# Patient Record
Sex: FEMALE | Race: BLACK OR AFRICAN AMERICAN | Employment: UNEMPLOYED | URBAN - METROPOLITAN AREA
[De-identification: names, ages, dates, MRNs, and addresses within clinical notes are randomized per-mention and may not be internally consistent; named-entity substitution may affect disease eponyms.]

---
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
## 2025-02-18 ENCOUNTER — TELEPHONE (OUTPATIENT)
Age: 20
End: 2025-02-18

## 2025-02-18 DIAGNOSIS — Z87.898 HISTORY OF PREDIABETES: Primary | ICD-10-CM

## 2025-02-18 DIAGNOSIS — E06.3 AUTOIMMUNE HYPOTHYROIDISM: ICD-10-CM

## 2025-02-18 DIAGNOSIS — E06.3 HASHIMOTO'S DISEASE: ICD-10-CM

## 2025-02-18 NOTE — TELEPHONE ENCOUNTER
Patient was referred to [facility] and that office is requesting office notes, labs, demographics and a referral.    Please fax to [phone]  # [phone]

## 2025-02-18 NOTE — TELEPHONE ENCOUNTER
Called anusha to let her know that the referral was obtained and is being faxed over to [facility] with her notes and labs as requested

## 2025-02-18 NOTE — TELEPHONE ENCOUNTER
Patient Re says that she is transitioning to adult care.  The new provider needs a referral, office notes, demographics, and recent labs.  Patient would like a return call.    Please advise.    Patient [phone]  [facility] Fax [phone]

## 2025-02-25 LAB — HBA1C MFR BLD HPLC: 10.7 %

## 2025-02-27 ENCOUNTER — TELEPHONE (OUTPATIENT)
Age: 20
End: 2025-02-27

## 2025-02-27 NOTE — TELEPHONE ENCOUNTER
Called. No ans. LM on VM with the names/numbers of a few providers in the [place] area.  Dr Downey was one and so was ANGIE Hubbard.  Asked for a call back with questions and to discuss further

## 2025-04-02 ENCOUNTER — OFFICE VISIT (OUTPATIENT)
Age: 20
End: 2025-04-02
Payer: MEDICAID

## 2025-04-02 VITALS
OXYGEN SATURATION: 99 % | BODY MASS INDEX: 37.1 KG/M2 | DIASTOLIC BLOOD PRESSURE: 79 MMHG | TEMPERATURE: 97.7 F | HEIGHT: 67 IN | SYSTOLIC BLOOD PRESSURE: 138 MMHG | HEART RATE: 78 BPM | WEIGHT: 236.4 LBS

## 2025-04-02 DIAGNOSIS — Z86.39 HISTORY OF HYPOTHYROIDISM: ICD-10-CM

## 2025-04-02 DIAGNOSIS — E10.65 TYPE 1 DIABETES MELLITUS WITH HYPERGLYCEMIA (HCC): Primary | ICD-10-CM

## 2025-04-02 DIAGNOSIS — E10.65 TYPE 1 DIABETES MELLITUS WITH HYPERGLYCEMIA (HCC): ICD-10-CM

## 2025-04-02 PROCEDURE — 99204 OFFICE O/P NEW MOD 45 MIN: CPT | Performed by: INTERNAL MEDICINE

## 2025-04-02 PROCEDURE — 99214 OFFICE O/P EST MOD 30 MIN: CPT | Performed by: INTERNAL MEDICINE

## 2025-04-02 RX ORDER — INSULIN GLARGINE 100 [IU]/ML
22 INJECTION, SOLUTION SUBCUTANEOUS NIGHTLY
Qty: 35 ML | Refills: 3 | Status: SHIPPED | OUTPATIENT
Start: 2025-04-02

## 2025-04-02 RX ORDER — BLOOD SUGAR DIAGNOSTIC
STRIP MISCELLANEOUS
Qty: 400 EACH | Refills: 3 | Status: SHIPPED | OUTPATIENT
Start: 2025-04-02 | End: 2025-04-03 | Stop reason: SDUPTHER

## 2025-04-02 RX ORDER — INSULIN LISPRO 100 [IU]/ML
INJECTION, SOLUTION INTRAVENOUS; SUBCUTANEOUS
Qty: 25 ML | Refills: 3 | Status: SHIPPED | OUTPATIENT
Start: 2025-04-02

## 2025-04-02 RX ORDER — INSULIN LISPRO 100 [IU]/ML
INJECTION, SOLUTION INTRAVENOUS; SUBCUTANEOUS
COMMUNITY
Start: 2025-02-28 | End: 2025-04-02 | Stop reason: SDUPTHER

## 2025-04-02 RX ORDER — ACYCLOVIR 400 MG/1
TABLET ORAL
Qty: 3 EACH | Refills: 3 | Status: SHIPPED | OUTPATIENT
Start: 2025-04-02

## 2025-04-02 NOTE — PROGRESS NOTES
Consultation - Diabetes     PCP: Henna Panchal MD    Chief Complaint   Patient presents with    Diabetes     HPI:  Re Bartlett is a 20 y.o. female with  has a past medical history of Diabetes mellitus (HCC), Hypertension, POTS (postural orthostatic tachycardia syndrome), and Thyroid activity decreased. who is seen in consultation at the request of Henna Panchal MD for evaluation of diabetes.     Diagnosed with Type 1 diabetes: 2/2025 after presenting with DKA   Went to ER for fatigue, polyuria/urinary frequency     Hypothyroidism dx at age 8   Patient has no known history of thyroid surgery, radioactive iodine or ionizing radiation to the head or the neck.  No family history of thyroid cancer. No known history of thyroid nodule or prior FNA of thyroid.   Levothyroxine stopped at age, then euthyroid per pt report.     Current DM medications:  Lantus 30 units qhs   Humalog SS TID w/meals, approx 4 units w/meals     BG trends:  Checking 8-10x/day   Range: 50-300s     No high sugar beverages   Activity - limited to ADL's     Full ROS completed this visit:  +weight gain after restarting insulin   Negative for fevers, chills, blurry vision, changes in vision, chest pain, palpitations, leg swelling, shortness of breath, wheezing, snoring, abdominal pain, nausea, vomiting, diarrhea, constipation, frequent urination, dysuria, tremors, fainting, shakes, cold intolerance, hot intolerance, depression, anxiety, foot sores, rash.    LABS/STUDIES:     No results found for: \"[id number]\"    Lab Results   Component Value Date/Time    LABA1C 5.5 05/31/2023 12:00 AM    LABA1C 5.5 03/31/2022 12:00 AM    LABA1C 5.3 06/13/2019 09:18 AM    CREATININE 0.87 03/31/2022 12:00 AM    LABGLOM CANCELED 03/31/2022 12:00 AM       Lab Results   Component Value Date/Time    CHOL 155 03/31/2022 12:00 AM    TRIG 57 03/31/2022 12:00 AM    HDL 50 03/31/2022 12:00 AM       Lab Results   Component Value Date/Time    TSH 3.890 05/31/2023 12:00 AM       Lab

## 2025-04-02 NOTE — PROGRESS NOTES
Re Bartlett is a 20 y.o. female here for   Chief Complaint   Patient presents with    Diabetes       1. Have you been to the ER, urgent care clinic since your last visit?  Hospitalized since your last visit? -Fiorella liu Feb 2025 DKA for multiple nights    2. Have you seen or consulted any other health care providers outside of the [facility] since your last visit?  Include any pap smears or colon screening.-No

## 2025-04-02 NOTE — PATIENT INSTRUCTIONS
Decrease Lantus 22 units nightly   Increase Humalog 5 units with meals plus scale  Glucose: Dose:               No Insulin  140-199 1 Unit  200-249 2 Units  250-299 3 Units  300-349 4 Units  350-399 5 Units  Over 399 6 Units    Diabetes general guidelines:   Target glucose  Fastin-130  2 hours after meals: less than 180    Check glucose levels as directed   Have a small snack if glucose is less than 80 at bedtime    Seek urgent care if glucose levels above 300s and not coming down, especially if associated with excessive thirst, urination, confusion or generally feeling unwell.     For Blood Glucose < 70 mg/dl treat with 4 ounces of juice or 4 glucose tablets (15 g). Recheck Blood Glucose in 15 minutes. Repeat until blood glucose is > 70.

## 2025-04-03 ENCOUNTER — PATIENT MESSAGE (OUTPATIENT)
Age: 20
End: 2025-04-03

## 2025-04-03 DIAGNOSIS — E10.65 TYPE 1 DIABETES MELLITUS WITH HYPERGLYCEMIA (HCC): ICD-10-CM

## 2025-04-03 LAB
25(OH)D3+25(OH)D2 SERPL-MCNC: 26.1 NG/ML (ref 30–100)
ALBUMIN SERPL-MCNC: 4.2 G/DL (ref 4–5)
ALP SERPL-CCNC: 71 IU/L (ref 42–106)
ALT SERPL-CCNC: 17 IU/L (ref 0–32)
AST SERPL-CCNC: 18 IU/L (ref 0–40)
BILIRUB SERPL-MCNC: 0.6 MG/DL (ref 0–1.2)
BUN SERPL-MCNC: 13 MG/DL (ref 6–20)
BUN/CREAT SERPL: 15 (ref 9–23)
CALCIUM SERPL-MCNC: 9.8 MG/DL (ref 8.7–10.2)
CHLORIDE SERPL-SCNC: 103 MMOL/L (ref 96–106)
CO2 SERPL-SCNC: 24 MMOL/L (ref 20–29)
CREAT SERPL-MCNC: 0.84 MG/DL (ref 0.57–1)
EGFRCR SERPLBLD CKD-EPI 2021: 102 ML/MIN/1.73
GLOBULIN SER CALC-MCNC: 2.6 G/DL (ref 1.5–4.5)
GLUCOSE SERPL-MCNC: 121 MG/DL (ref 70–99)
POTASSIUM SERPL-SCNC: 4.4 MMOL/L (ref 3.5–5.2)
PROT SERPL-MCNC: 6.8 G/DL (ref 6–8.5)
SODIUM SERPL-SCNC: 140 MMOL/L (ref 134–144)

## 2025-04-03 RX ORDER — INSULIN GLARGINE 100 [IU]/ML
22 INJECTION, SOLUTION SUBCUTANEOUS NIGHTLY
Qty: 35 ML | Refills: 3 | Status: CANCELLED | OUTPATIENT
Start: 2025-04-03

## 2025-04-03 RX ORDER — ERGOCALCIFEROL 1.25 MG/1
50000 CAPSULE, LIQUID FILLED ORAL WEEKLY
Qty: 12 CAPSULE | Refills: 1 | OUTPATIENT
Start: 2025-04-03

## 2025-04-04 ENCOUNTER — RESULTS FOLLOW-UP (OUTPATIENT)
Age: 20
End: 2025-04-04

## 2025-04-04 LAB — C PEPTIDE SERPL-MCNC: 2.5 NG/ML (ref 1.1–4.4)

## 2025-04-04 RX ORDER — BLOOD-GLUCOSE CONTROL, LOW
EACH MISCELLANEOUS
Qty: 1 EACH | Refills: 1 | Status: SHIPPED | OUTPATIENT
Start: 2025-04-04

## 2025-04-04 RX ORDER — BLOOD-GLUCOSE METER
EACH MISCELLANEOUS
Qty: 1 EACH | Refills: 0 | Status: SHIPPED | OUTPATIENT
Start: 2025-04-04

## 2025-04-04 RX ORDER — GLUCOSAM/CHON-MSM1/C/MANG/BOSW 500-416.6
TABLET ORAL
Qty: 300 EACH | Refills: 3 | Status: SHIPPED | OUTPATIENT
Start: 2025-04-04

## 2025-04-04 RX ORDER — BLOOD SUGAR DIAGNOSTIC
STRIP MISCELLANEOUS
Qty: 400 EACH | Refills: 3 | Status: SHIPPED | OUTPATIENT
Start: 2025-04-04

## 2025-04-04 RX ORDER — CALCIUM CITRATE/VITAMIN D3 200MG-6.25
TABLET ORAL
Qty: 300 EACH | Refills: 3 | Status: SHIPPED | OUTPATIENT
Start: 2025-04-04

## 2025-04-08 LAB — GAD65 AB SER IA-ACNC: ABNORMAL U/ML (ref 0–5)

## 2025-04-12 LAB — ISLET CELL512 AB SER-ACNC: >120 U/ML

## 2025-04-14 ENCOUNTER — PATIENT MESSAGE (OUTPATIENT)
Age: 20
End: 2025-04-14

## 2025-04-14 DIAGNOSIS — E10.65 TYPE 1 DIABETES MELLITUS WITH HYPERGLYCEMIA (HCC): ICD-10-CM

## 2025-04-14 RX ORDER — CALCIUM CITRATE/VITAMIN D3 200MG-6.25
TABLET ORAL
Qty: 300 EACH | Refills: 3 | Status: SHIPPED | OUTPATIENT
Start: 2025-04-14

## 2025-04-14 RX ORDER — ACYCLOVIR 400 MG/1
TABLET ORAL
Qty: 9 EACH | Refills: 3 | Status: SHIPPED | OUTPATIENT
Start: 2025-04-14

## 2025-04-15 RX ORDER — GLUCAGON 3 MG/1
POWDER NASAL
Qty: 1 EACH | Refills: 1 | Status: SHIPPED | OUTPATIENT
Start: 2025-04-15

## 2025-04-16 LAB — ZNT8 AB SERPL IA-ACNC: >500 U/ML

## 2025-05-07 ENCOUNTER — OFFICE VISIT (OUTPATIENT)
Age: 20
End: 2025-05-07
Payer: MEDICAID

## 2025-05-07 VITALS
DIASTOLIC BLOOD PRESSURE: 73 MMHG | BODY MASS INDEX: 38.92 KG/M2 | SYSTOLIC BLOOD PRESSURE: 135 MMHG | HEART RATE: 87 BPM | OXYGEN SATURATION: 98 % | TEMPERATURE: 97.5 F | HEIGHT: 67 IN | WEIGHT: 248 LBS

## 2025-05-07 DIAGNOSIS — E55.9 VITAMIN D DEFICIENCY: ICD-10-CM

## 2025-05-07 DIAGNOSIS — Z86.39 HISTORY OF HYPOTHYROIDISM: ICD-10-CM

## 2025-05-07 DIAGNOSIS — N91.1 SECONDARY AMENORRHEA: ICD-10-CM

## 2025-05-07 DIAGNOSIS — E10.65 TYPE 1 DIABETES MELLITUS WITH HYPERGLYCEMIA (HCC): Primary | ICD-10-CM

## 2025-05-07 PROCEDURE — 99215 OFFICE O/P EST HI 40 MIN: CPT | Performed by: INTERNAL MEDICINE

## 2025-05-07 PROCEDURE — 95251 CONT GLUC MNTR ANALYSIS I&R: CPT | Performed by: INTERNAL MEDICINE

## 2025-05-07 RX ORDER — INSULIN GLARGINE 100 [IU]/ML
24 INJECTION, SOLUTION SUBCUTANEOUS NIGHTLY
Qty: 35 ML | Refills: 3 | Status: SHIPPED | OUTPATIENT
Start: 2025-05-07

## 2025-05-07 NOTE — PROGRESS NOTES
Endocrine follow up     CC:   Chief Complaint   Patient presents with    Diabetes     PCP:Henna Panchal MD  Referring provider: No referring provider defined for this encounter.    HPI  20 y.o. female  has a past medical history of Diabetes mellitus (HCC), Hypertension, Hypoglycemia, POTS (postural orthostatic tachycardia syndrome), Thyroid activity decreased, and Vitamin D deficiency. here for follow up.     Diagnosed with Type 1 diabetes: 2/2025 after presenting with DKA   Went to ER for fatigue, polyuria/urinary frequency      Hypothyroidism dx at age 8   Patient has no known history of thyroid surgery, radioactive iodine or ionizing radiation to the head or the neck.  No family history of thyroid cancer. No known history of thyroid nodule or prior FNA of thyroid.   Levothyroxine stopped at age, then euthyroid per pt report.      History of Present Illness  Type 1 Diabetes Mellitus  - The patient has been adjusting their insulin regimen, administering 1 to 2 units during snack times, which occasionally consist of 36 grams of carbohydrates.  - Their current Lantus dosage ranges from 24 to 28 units, adjusted based on blood glucose levels.  - Additionally, they administer Humalog, 5 units plus a sliding scale with each meal and snack, and occasionally use it for hyperglycemia correction.  - The patient is interested in understanding their carbohydrate ratio and diligently counts their carbohydrate intake.  - They have glucagon available for emergency use.  - The patient is considering the use of an insulin pump and is seeking a letter for workplace accommodations due to their diabetes.  - They report increased frequency of urination due to hydration efforts and require frequent breaks to monitor their blood glucose levels and consume snacks.  - They have not experienced any chest pain, dyspnea, nausea, or vomiting.  - They report no abdominal pain or peripheral edema.    Secondary Amenorrhea  - The patient has not

## 2025-05-07 NOTE — PROGRESS NOTES
Re Bartlett is a 20 y.o. female here for   Chief Complaint   Patient presents with    Diabetes       1. Have you been to the ER, urgent care clinic since your last visit?  Hospitalized since your last visit? -no    2. Have you seen or consulted any other health care providers outside of the [facility] since your last visit?  Include any pap smears or colon screening.-no

## 2025-05-07 NOTE — PATIENT INSTRUCTIONS
- Lantus 24 units daily  - Humalog 1 unit for every 15g of carbs + scale  Glucose: Dose:               No Insulin  140-199 1 Unit  200-249 2 Units  250-299 3 Units  300-349 4 Units  350-399 5 Units  Over 399 6 Units    Diabetes general guidelines:   Target glucose  Fastin-130  2 hours after meals: less than 180    Check glucose levels as directed   Have a small snack if glucose is less than 80 at bedtime    Seek urgent care if glucose levels above 300s and not coming down, especially if associated with excessive thirst, urination, confusion or generally feeling unwell.     For Blood Glucose < 70 mg/dl treat with 4 ounces of juice or 4 glucose tablets (15 g). Recheck Blood Glucose in 15 minutes. Repeat until blood glucose is > 70.

## 2025-05-09 ENCOUNTER — RESULTS FOLLOW-UP (OUTPATIENT)
Age: 20
End: 2025-05-09

## 2025-05-09 LAB
FSH SERPL-ACNC: 7.3 MIU/ML
LH SERPL-ACNC: 8.9 MIU/ML
PROLACTIN SERPL-MCNC: 12.1 NG/ML (ref 4.8–33.4)
T4 FREE SERPL-MCNC: 1.09 NG/DL (ref 0.82–1.77)
TSH SERPL DL<=0.005 MIU/L-ACNC: 2.76 UIU/ML (ref 0.45–4.5)

## 2025-05-11 LAB — TESTOST SERPL-MCNC: 45.9 NG/DL (ref 10–55)

## 2025-05-12 NOTE — TELEPHONE ENCOUNTER
----- Message from Dr. Elsy Diehl MD sent at 5/12/2025  1:57 PM EDT -----  Testosterone is normal.  ----- Message -----  From: Reshma Joshi LPN  Sent: 5/12/2025   1:46 PM EDT  To: Elsy Diehl MD

## 2025-05-12 NOTE — TELEPHONE ENCOUNTER
Attempted to call. Unsuccessful. Left msg for Re Bartlett to check MyChart or to give us a call back at the office. A callback number was left.

## 2025-05-12 NOTE — TELEPHONE ENCOUNTER
Informed pt of Dr. Diehl's note. Pt verbalized understanding. Pt stated she had an appt with the Gynecologist today but she lost her ID so she have to reschedule. Pt stated that the testosterone labs are back and would like Dr. Diehl to look at it.

## 2025-05-12 NOTE — TELEPHONE ENCOUNTER
----- Message from Dr. Elsy Diehl MD sent at 5/12/2025 10:13 AM EDT -----  Recommend that she see a gynecologist for the missed periods. Her hormone levels are normal. Does she already have one or does she need a referral?

## 2025-05-21 DIAGNOSIS — E10.65 TYPE 1 DIABETES MELLITUS WITH HYPERGLYCEMIA (HCC): ICD-10-CM

## 2025-05-21 RX ORDER — CALCIUM CITRATE/VITAMIN D3 200MG-6.25
TABLET ORAL
Qty: 300 EACH | Refills: 3 | Status: SHIPPED | OUTPATIENT
Start: 2025-05-21

## 2025-05-27 ENCOUNTER — OFFICE VISIT (OUTPATIENT)
Age: 20
End: 2025-05-27
Payer: MEDICAID

## 2025-05-27 DIAGNOSIS — E10.65 TYPE 1 DIABETES MELLITUS WITH HYPERGLYCEMIA (HCC): Primary | ICD-10-CM

## 2025-05-27 PROCEDURE — G0108 DIAB MANAGE TRN  PER INDIV: HCPCS | Performed by: DIETITIAN, REGISTERED

## 2025-05-28 NOTE — PROGRESS NOTES
[facility]  Diabetes Self-Management Education & Support Program  Encounter Note      SUMMARY  Diabetes self-care management training was completed related to DSMES and insulin pump start. Re shared that she has only been dx with Type 1 DM for 2-3 months. While she is open to learning about insulin pumps she also does not feel confident in her understanding of her dx and has many questions.     EVALUATION:  Re arrived for her appointment today w/ her mother, Jackie Castillo. She is struggling with trusting her sensor values and is completing multiple BG test as a result. She also had questions about her Type 1 DM so we did some question and answer. We will review medication and her insulin dosing. All education with be provided as knowledge for MDI and how this may change for pump.   We will address insulin pump systems when she is ready.  She and her mother are addressing work related accommodations for managing her disease.     RECOMMENDATIONS:  1) we will complete more assessment at next visit and discuss medication.    2) limit calibration to after 24 hours and only complete if trend arrow is steady.     TOPICS DISCUSSED TODAY:  HOW CAN BLOOD GLUCOSE MONITORING HELP ME? 60      Next provider visit is scheduled for 7/7/25       DATE DSMES TOPIC EVALUATION   5/27/25 HOW CAN BLOOD GLUCOSE MONITORING HELP ME?   Value of blood glucose monitoring   Realistic expectations   Differences between sensor and blood glucose values.  Setting alerts on sensor for high/low/out of range  Using sensor glucose levels for treatment decisions.  Using glucometer for treatment decisions   Use of sensor trend arrows when dosing insulin   Sensor sites on body and relative to infusion sets  Tips for improving adhesion    Downloading and using smartphone apps  Data sharing with provider    Re had concerns about her sensor accuracy, calibrating and sensor alerts. She reports completing multiple

## 2025-06-06 ENCOUNTER — OFFICE VISIT (OUTPATIENT)
Age: 20
End: 2025-06-06
Payer: MEDICAID

## 2025-06-06 DIAGNOSIS — E10.65 TYPE 1 DIABETES MELLITUS WITH HYPERGLYCEMIA (HCC): ICD-10-CM

## 2025-06-06 DIAGNOSIS — E10.65 TYPE 1 DIABETES MELLITUS WITH HYPERGLYCEMIA (HCC): Primary | ICD-10-CM

## 2025-06-06 PROCEDURE — G0108 DIAB MANAGE TRN  PER INDIV: HCPCS | Performed by: DIETITIAN, REGISTERED

## 2025-06-06 RX ORDER — ACYCLOVIR 400 MG/1
TABLET ORAL
Qty: 9 EACH | Refills: 3 | Status: SHIPPED | OUTPATIENT
Start: 2025-06-06

## 2025-06-06 NOTE — PROGRESS NOTES
[facility]  Diabetes Self-Management Education & Support Program  Encounter Note      SUMMARY  Diabetes self-care management training was completed related to what is diabetes.     EVALUATION:  Re arrived with her mother, Jackie. She and I discussed her DM - including answering questions about conception, contraception and healthy pregnancy. Encouraged her to also talk to .     RECOMMENDATIONS:  Continue to bring questions to any visit.   We will work on meal planning at next visit.      TOPICS DISCUSSED TODAY:  WHAT IS DIABETES? Minutes: 60      Next provider visit is scheduled for 7/7/25       [facility]     6/6/2025 WHAT IS DIABETES?   Role of the normal pancreas in energy balance and blood glucose control   The defect seen in diabetes   Signs & symptoms of diabetes   Diagnosis of diabetes   Types of diabetes   Blood glucose targets in pregnant & non-pregnant adults       The participant knows  Their type of diabetes: Yes   The basic physiologic defect: Yes  Blood glucose targets: Yes  She had great questions about her overall disease, how A1c impacts her long term health and complication risk. Asked questions about possibility for children - discussed A1c and pre-conception health guidelines for type 1 DM. Discussed questions about losing weight with Type 1 DM and how would her diet/meal plan be determined. Shared strategies related to insulin and honeymoon period when arrives - reinforced prompt communication with  for medication changes either way.   Currently does not drink alcohol - will review when we discuss meal planning and dining out.      DION REARDON RD,[facility] on 6/6/2025 at 4:12 PM    I have personally reviewed the health record, including provider notes, laboratory data and current medications before making these care and education recommendations. The time spent in this effort is included in the total time.  Total

## 2025-06-09 ENCOUNTER — PATIENT MESSAGE (OUTPATIENT)
Age: 20
End: 2025-06-09

## 2025-06-09 DIAGNOSIS — E10.65 TYPE 1 DIABETES MELLITUS WITH HYPERGLYCEMIA (HCC): Primary | ICD-10-CM

## 2025-06-09 RX ORDER — PROCHLORPERAZINE 25 MG/1
SUPPOSITORY RECTAL
Qty: 9 EACH | Refills: 3 | Status: SHIPPED | OUTPATIENT
Start: 2025-06-09

## 2025-06-10 RX ORDER — PROCHLORPERAZINE 25 MG/1
SUPPOSITORY RECTAL
Qty: 1 EACH | Refills: 3 | Status: SHIPPED | OUTPATIENT
Start: 2025-06-10

## 2025-06-22 DIAGNOSIS — E10.65 TYPE 1 DIABETES MELLITUS WITH HYPERGLYCEMIA (HCC): ICD-10-CM

## 2025-06-23 RX ORDER — CALCIUM CITRATE/VITAMIN D3 200MG-6.25
TABLET ORAL
Qty: 300 EACH | Refills: 3 | Status: SHIPPED | OUTPATIENT
Start: 2025-06-23

## 2025-06-27 ENCOUNTER — OFFICE VISIT (OUTPATIENT)
Age: 20
End: 2025-06-27
Payer: MEDICAID

## 2025-06-27 DIAGNOSIS — E10.65 TYPE 1 DIABETES MELLITUS WITH HYPERGLYCEMIA (HCC): Primary | ICD-10-CM

## 2025-06-27 PROCEDURE — G0108 DIAB MANAGE TRN  PER INDIV: HCPCS | Performed by: DIETITIAN, REGISTERED

## 2025-06-30 NOTE — PROGRESS NOTES
[facility]  Diabetes Self-Management Education & Support Program  Encounter Note      SUMMARY  Diabetes self-care management training was completed related to healthy eating. The participant will return on July 25 to continue DSMES related to physical activity. The participant did not identify SMART Goal(s) and will practice knowledge and skills related to healthy eating to improve diabetes self-management.        EVALUATION:  Re shared that she has been working on meal planning and losing weight. She will be traveling with a friend soon and asked for tips for managing her DM. Discussed dining out and ethnic foods - how to estimate portion and carb for insulin.     RECOMMENDATIONS:  1) continue to work on 3 meals per day.  2) consider using [employer] food data central resource along with food scale   3) Check into [place] case options for handling insulin when traveling.      TOPICS DISCUSSED TODAY:  WHAT CAN I EAT? 60      Next provider visit is scheduled for 7/7/25       DATE DSMES TOPIC EVALUATION     6/27/25 WHAT CAN I EAT?   General principles   Determining a healthy weight   Nutritional terms & tools   Healthy Plate method   Carbohydrate Counting   Reading food labels   Free apps        The participant   Uses Healthy Plate principles in constructing meals: Yes  Reads food labels in choosing acceptable foods: Yes  Shared how to use resources:  Choose Your Foods, USDA data base, Calorie [employer] etc for estimating CHO intakes when dining out or home. Re has a food scale she is using for determining portions and helped her with additional tips. Discussed how to manage food choices when traveling. Shared tips for estimating portions when dining out.        DION REARDON RD [facility] on 6/30/2025 at 12:56 PM    I have personally reviewed the health record, including provider notes, laboratory data and current medications before making these care and education recommendations. Total

## 2025-07-07 ENCOUNTER — OFFICE VISIT (OUTPATIENT)
Age: 20
End: 2025-07-07
Payer: MEDICAID

## 2025-07-07 VITALS
SYSTOLIC BLOOD PRESSURE: 134 MMHG | BODY MASS INDEX: 39.46 KG/M2 | HEART RATE: 67 BPM | WEIGHT: 251.4 LBS | DIASTOLIC BLOOD PRESSURE: 76 MMHG | HEIGHT: 67 IN | OXYGEN SATURATION: 95 % | TEMPERATURE: 97.6 F

## 2025-07-07 DIAGNOSIS — E10.65 TYPE 1 DIABETES MELLITUS WITH HYPERGLYCEMIA (HCC): Primary | ICD-10-CM

## 2025-07-07 DIAGNOSIS — Z86.39 HISTORY OF HYPOTHYROIDISM: ICD-10-CM

## 2025-07-07 DIAGNOSIS — E55.9 VITAMIN D DEFICIENCY: ICD-10-CM

## 2025-07-07 DIAGNOSIS — N91.1 SECONDARY AMENORRHEA: ICD-10-CM

## 2025-07-07 PROCEDURE — 95251 CONT GLUC MNTR ANALYSIS I&R: CPT | Performed by: INTERNAL MEDICINE

## 2025-07-07 PROCEDURE — 99214 OFFICE O/P EST MOD 30 MIN: CPT | Performed by: INTERNAL MEDICINE

## 2025-07-07 NOTE — PROGRESS NOTES
Re Bartlett is a 20 y.o. female here for   Chief Complaint   Patient presents with    Diabetes    Thyroid Problem       1. Have you been to the ER, urgent care clinic since your last visit?  Hospitalized since your last visit? -no    2. Have you seen or consulted any other health care providers outside of the [facility] since your last visit?  Include any pap smears or colon screening.-no

## 2025-07-20 ENCOUNTER — PATIENT MESSAGE (OUTPATIENT)
Age: 20
End: 2025-07-20

## 2025-07-21 NOTE — TELEPHONE ENCOUNTER
Attempted to call. Unsuccessful. Left msg for Re Batrlett to check MyChart or to give us a call back at the office. A callback number was left.

## 2025-08-06 ENCOUNTER — TELEPHONE (OUTPATIENT)
Age: 20
End: 2025-08-06

## 2025-08-08 ENCOUNTER — TELEMEDICINE (OUTPATIENT)
Age: 20
End: 2025-08-08
Payer: MEDICAID

## 2025-08-08 DIAGNOSIS — E10.65 TYPE 1 DIABETES MELLITUS WITH HYPERGLYCEMIA (HCC): Primary | ICD-10-CM

## 2025-08-08 PROCEDURE — G0108 DIAB MANAGE TRN  PER INDIV: HCPCS | Performed by: DIETITIAN, REGISTERED
